# Patient Record
Sex: FEMALE | Race: WHITE | Employment: OTHER | URBAN - METROPOLITAN AREA
[De-identification: names, ages, dates, MRNs, and addresses within clinical notes are randomized per-mention and may not be internally consistent; named-entity substitution may affect disease eponyms.]

---

## 2017-02-20 ENCOUNTER — TRANSFERRED RECORDS (OUTPATIENT)
Dept: HEALTH INFORMATION MANAGEMENT | Facility: CLINIC | Age: 72
End: 2017-02-20

## 2017-02-27 ENCOUNTER — OFFICE VISIT (OUTPATIENT)
Dept: GASTROENTEROLOGY | Facility: CLINIC | Age: 72
End: 2017-02-27
Attending: INTERNAL MEDICINE
Payer: MEDICARE

## 2017-02-27 VITALS
OXYGEN SATURATION: 98 % | BODY MASS INDEX: 22.7 KG/M2 | SYSTOLIC BLOOD PRESSURE: 140 MMHG | WEIGHT: 128.09 LBS | TEMPERATURE: 97.8 F | HEIGHT: 63 IN | HEART RATE: 84 BPM | DIASTOLIC BLOOD PRESSURE: 87 MMHG | RESPIRATION RATE: 16 BRPM

## 2017-02-27 DIAGNOSIS — K21.9 GASTROESOPHAGEAL REFLUX DISEASE WITHOUT ESOPHAGITIS: Primary | ICD-10-CM

## 2017-02-27 PROCEDURE — 99212 OFFICE O/P EST SF 10 MIN: CPT

## 2017-02-27 RX ORDER — VITAMIN B COMPLEX
2 TABLET ORAL
COMMUNITY
Start: 2015-03-20 | End: 2022-01-28

## 2017-02-27 RX ORDER — PANTOPRAZOLE SODIUM 40 MG/1
40 TABLET, DELAYED RELEASE ORAL DAILY
Qty: 30 TABLET | Refills: 1 | Status: SHIPPED | OUTPATIENT
Start: 2017-02-27 | End: 2022-01-25

## 2017-02-27 RX ORDER — ONDANSETRON 4 MG/1
4 TABLET, ORALLY DISINTEGRATING ORAL
COMMUNITY
Start: 2015-04-21 | End: 2022-01-25

## 2017-02-27 RX ORDER — PENCICLOVIR 10 MG/G
CREAM TOPICAL
COMMUNITY
Start: 2009-07-28 | End: 2022-01-25

## 2017-02-27 RX ORDER — FLUCONAZOLE 150 MG/1
150 TABLET ORAL
COMMUNITY
Start: 2016-09-06 | End: 2022-01-25

## 2017-02-27 RX ORDER — IPRATROPIUM BROMIDE 21 UG/1
2 SPRAY, METERED NASAL
COMMUNITY
Start: 2017-01-31

## 2017-02-27 RX ORDER — DIPHENOXYLATE HYDROCHLORIDE AND ATROPINE SULFATE 2.5; .025 MG/1; MG/1
TABLET ORAL
COMMUNITY
Start: 2008-12-08

## 2017-02-27 RX ORDER — BUSPIRONE HYDROCHLORIDE 15 MG/1
7.5 TABLET ORAL 2 TIMES DAILY
COMMUNITY
Start: 2014-07-11

## 2017-02-27 RX ORDER — ESCITALOPRAM OXALATE 5 MG/1
5 TABLET ORAL
COMMUNITY
Start: 2016-11-06 | End: 2022-01-25

## 2017-02-27 ASSESSMENT — ENCOUNTER SYMPTOMS
RECTAL PAIN: 0
VOMITING: 0
DIARRHEA: 0
BLOOD IN STOOL: 0
HEARTBURN: 1
CONSTIPATION: 0
RECTAL BLEEDING: 0
BLOATING: 1
BOWEL INCONTINENCE: 0
NAUSEA: 1
JAUNDICE: 0
ABDOMINAL PAIN: 1

## 2017-02-27 ASSESSMENT — PAIN SCALES - GENERAL: PAINLEVEL: NO PAIN (0)

## 2017-02-27 NOTE — PROGRESS NOTES
"PRESENTING COMPLAINT:  The patient is a 71-year-old white female who is self-referred today for a second opinion regarding epigastric burning and presumed reflux.      HISTORY OF PRESENT ILLNESS:  The patient has intermittent history of epigastric complaints.  She, in the past, had endoscopy in 2007 and was told she had gastritis but an apparently normal esophagus.  I do not have this report.  Since that time, she has intermittently had epigastric and substernal burning.  In the past, she would try flavia for this, but more recently this has not been working.  For the past several months, she has had increasing complaints.  She tried taking Zantac 150 mg twice a day.  She was seen through her primary clinic on 02/20 for this complaint and at that time, reported that she was feeling better on the Zantac but since that time, Zantac stopped working.  She took this for a total of 10 days and stopped due to ineffective therapy.  She was taking 150 mg twice a day.  Subsequently, she had a supply of Protonix leftover from 2007 for which she had 5 tablets.  She took these once a day for the last 5 days and states that she has been \"much better.\"  Her symptoms are nearly completely resolved.      She describes a cramping and burning sensation in the epigastrium and lower substernal region.  This can last for many hours.  This, if anything, is increased with food and will last for approximately an hour.  She has no dysphagia, diarrhea, melena or hematochezia.  Her weight has been stable.  She, in the past, has had nausea but states this is not a major complaint for her at present.  She had a recent H. pylori testing with a fecal antigen through her primary care clinic, which she states was negative.  I do not have this result.  She denies NSAID use.      She states that she is intolerant to wheat, corn, and dairy and has been given some type of an antigen droplet through an outside physician, which she was taking and taking " "it larger than the recommended dosing.  She wonders whether this may have been contributing to her symptoms.      She had a colonoscopy in 09/2015, which was essentially normal.  There was a 3 mm polyp for which biopsy returned normal.  She does drink caffeine, up to a cup of coffee a day and has been cutting back but not completely eliminating.  She also drinks 1-2 glasses of wine per day.      She reports having had other evaluation, which I do not have available to me.  This included a HIDA scan for her gallbladder.  Apparently at one point there was consideration of cholecystectomy but her symptoms resolved.  She did not ever have a right upper quadrant ultrasound.      PAST MEDICAL HISTORY:  Significant for hyperlipidemia, goiter, osteopenia (previously osteoporosis but improving), anxiety, and a variety of hip and shoulder complaints.      PAST SURGICAL HISTORY:  Significant for appendectomy at age 16, bilateral Kim's neuroma surgery, trigger finger surgery and carpal tunnel release.      ALLERGIES:  Penicillin, sulfa, and \"other antibiotics.\"      MEDICATIONS:  Reviewed and documented in Epic.  Currently taking Protonix 40 mg once a day for the last 5 days.      SOCIAL HISTORY:  She quit smoking in 1968.  Alcohol and other ingestion is as mentioned in history of present illness.  She drinks tea rarely and does not use mints.      FAMILY HISTORY:  Not assessed.      REVIEW OF SYSTEMS:  See patient self-reported inventory.      PHYSICAL EXAMINATION:   GENERAL:  The patient is well-appearing, in no acute distress.   VITAL SIGNS:  Documented in Epic.   HEENT:  Shows no evidence of scleral icterus.   LUNGS:  Clear.   CARDIAC:  Regular rate and rhythm without murmur, gallop or rub.   ABDOMEN:  Soft and benign.  There is no focal mass, tenderness or hepatosplenomegaly.  There is no succussion splash.  Bowel sounds are normoactive.   SKIN:  Shows no jaundice.      ASSESSMENT AND PLAN:  Dyspepsia.  I believe this " is most likely related to gastroesophageal reflux disease.  This could also represent gastritis; however, she is not on NSAIDs and H. pylori is negative.  In addition, her symptoms are, if anything, worsened with food which would be somewhat atypical for gastritis.  Differential diagnosis would include biliary colic; however, I would not expect this to improve so reliably with proton pump inhibitor therapy.      We discussed reflux precautions and I gave her printed material today.  I specifically requested that she completely abstain from alcohol and caffeine.  We discussed several options for medication management given her concerns regarding long-term side effects with proton pump inhibitors.  She stated that she was willing to take this for 4-8 weeks, but does not want to take long-term PPIs.  After much discussion, it was decided that she would take Protonix 40 mg once a day for a total of 8 weeks.  At that point, she will try to discontinue the medications to see if her dietary manipulation and reflux precautions will relieve her symptoms.  At that point, if her symptoms recur, then she will try Carafate, which we also discussed.  This would be a safer option for her long-term but can lead to constipation.      At this point, she will contact me as needed, either if her symptoms fail to resolve with Protonix, recurred while on Protonix or do not resolve with subsequent treatment with Carafate.  She was also instructed to contact me if she develops dysphagia, unexplained weight loss or blood in the stools.  Otherwise, she will follow up with us on an as-needed basis.       Answers for HPI/ROS submitted by the patient on 2/27/2017   General Symptoms: No  Skin Symptoms: No  HENT Symptoms: No  EYE SYMPTOMS: No  HEART SYMPTOMS: No  LUNG SYMPTOMS: No  INTESTINAL SYMPTOMS: Yes  URINARY SYMPTOMS: No  GYNECOLOGIC SYMPTOMS: No  BREAST SYMPTOMS: No  SKELETAL SYMPTOMS: No  BLOOD SYMPTOMS: No  NERVOUS SYSTEM SYMPTOMS:  No  MENTAL HEALTH SYMPTOMS: No  Heart burn or indigestion: Yes  Nausea: Yes  Vomiting: No  Abdominal pain: Yes  Bloating: Yes  Constipation: No  Diarrhea: No  Blood in stool: No  Black stools: No  Rectal or Anal pain: No  Fecal incontinence: No  Rectal bleeding: No  Yellowing of skin or eyes: No  Vomit with blood: No  Change in stools: No  Hemorrhoids: No

## 2017-02-27 NOTE — MR AVS SNAPSHOT
"              After Visit Summary   2/27/2017    Diane Pierre    MRN: [id number]           Patient Information     Date Of Birth          1945        Visit Information        Provider Department      2/27/2017 4:20 PM Curtis Man MD [facility]        Today's Diagnoses     Gastroesophageal reflux disease without esophagitis    -  1       Follow-ups after your visit        Follow-up notes from your care team     Return if symptoms worsen or fail to improve.      Who to contact     If you have questions or need follow up information about today's clinic visit or your schedule please contact [facility] directly at [phone].  Normal or non-critical lab and imaging results will be communicated to you by Holairahart, letter or phone within 4 business days after the clinic has received the results. If you do not hear from us within 7 days, please contact the clinic through Holairahart or phone. If you have a critical or abnormal lab result, we will notify you by phone as soon as possible.  Submit refill requests through POI or call your pharmacy and they will forward the refill request to us. Please allow 3 business days for your refill to be completed.          Additional Information About Your Visit        MyChart Information     POI gives you secure access to your electronic health record. If you see a primary care provider, you can also send messages to your care team and make appointments. If you have questions, please call your primary care clinic.  If you do not have a primary care provider, please call [phone] and they will assist you.        Care EveryWhere ID     This is your Care EveryWhere ID. This could be used by other organizations to access your [facility] medical records  [id number]        Your Vitals Were     Pulse Temperature Respirations Height Pulse Oximetry BMI (Body Mass Index)    84 97.8  F (36.6  C) (Oral) 16 1.59 m (5' 2.6\") 98% 22.98 " kg/m2       Blood Pressure from Last 3 Encounters:   02/27/17 140/87    Weight from Last 3 Encounters:   02/27/17 58.1 kg (128 lb 1.4 oz)              Today, you had the following     No orders found for display         Today's Medication Changes          These changes are accurate as of: 2/27/17 11:59 PM.  If you have any questions, ask your nurse or doctor.               These medicines have changed or have updated prescriptions.        Dose/Directions    * pantoprazole 40 MG EC tablet   Commonly known as:  PROTONIX   This may have changed:  You were already taking a medication with the same name, and this prescription was added. Make sure you understand how and when to take each.   Used for:  Gastroesophageal reflux disease without esophagitis   Changed by:  Curtis Man MD        Dose:  40 mg   Take 1 tablet (40 mg) by mouth daily Take 30-60 minutes before a meal.   Quantity:  30 tablet   Refills:  1       * PANTOPRAZOLE SODIUM PO   This may have changed:  Another medication with the same name was added. Make sure you understand how and when to take each.   Changed by:  Curtis Man MD        Refills:  0       * Notice:  This list has 2 medication(s) that are the same as other medications prescribed for you. Read the directions carefully, and ask your doctor or other care provider to review them with you.         Where to get your medicines      These medications were sent to [facility] [id number] [place] [address] [address] [place] [address] E AT [facility] [address]  [address] [place] [address] E, [facility] [address]     Phone:  [phone]     pantoprazole 40 MG EC tablet                Primary Care Provider Office Phone # Fax #    Maryam Solomon [phone]       [facility] [address] [facility] [address]        Thank you!     Thank you for choosing [facility]  for your care. Our goal is always to provide you with excellent care. Hearing back from our  patients is one way we can continue to improve our services. Please take a few minutes to complete the written survey that you may receive in the mail after your visit with us. Thank you!             Your Updated Medication List - Protect others around you: Learn how to safely use, store and throw away your medicines at www.disposemymeds.org.          This list is accurate as of: 2/27/17 11:59 PM.  Always use your most recent med list.                   Brand Name Dispense Instructions for use    ALOE PO      Aloe vera juice.       busPIRone 15 MG tablet    BUSPAR     Take 15 mg by mouth       DENAVIR 1 % cream   Generic drug:  penciclovir          diclofenac 1 % Gel topical gel    VOLTAREN         escitalopram 5 MG tablet    LEXAPRO     Take 5 mg by mouth       fluconazole 150 MG tablet    DIFLUCAN     Take 150 mg by mouth       HM OMEGA-3-6-9 FATTY ACIDS Caps      Take two capsules by mouth daily       ipratropium 0.03 % spray    ATROVENT     Spray 2 sprays in nostril       MULTI-VITAMINS Tabs          ondansetron 4 MG ODT tab    ZOFRAN-ODT     Place 4 mg under the tongue       * pantoprazole 40 MG EC tablet    PROTONIX    30 tablet    Take 1 tablet (40 mg) by mouth daily Take 30-60 minutes before a meal.       * PANTOPRAZOLE SODIUM PO          progesterone 100 MG capsule    PROMETRIUM     Take 100 mg by mouth       Vitamin-B Complex Tabs      Take 2 tablets by mouth       * Notice:  This list has 2 medication(s) that are the same as other medications prescribed for you. Read the directions carefully, and ask your doctor or other care provider to review them with you.

## 2017-02-27 NOTE — LETTER
"2/27/2017       RE: Diane Pierre  [address] [facility] [address]     Dear Colleague,    Thank you for referring your patient, Diane Pierre, to the [facility] at [facility]. Please see a copy of my visit note below.    PRESENTING COMPLAINT:  The patient is a 71-year-old white female who is self-referred today for a second opinion regarding epigastric burning and presumed reflux.      HISTORY OF PRESENT ILLNESS:  The patient has intermittent history of epigastric complaints.  She, in the past, had endoscopy in 2007 and was told she had gastritis but an apparently normal esophagus.  I do not have this report.  Since that time, she has intermittently had epigastric and substernal burning.  In the past, she would try flavia for this, but more recently this has not been working.  For the past several months, she has had increasing complaints.  She tried taking Zantac 150 mg twice a day.  She was seen through her primary clinic on 02/20 for this complaint and at that time, reported that she was feeling better on the Zantac but since that time, Zantac stopped working.  She took this for a total of 10 days and stopped due to ineffective therapy.  She was taking 150 mg twice a day.  Subsequently, she had a supply of Protonix leftover from 2007 for which she had 5 tablets.  She took these once a day for the last 5 days and states that she has been \"much better.\"  Her symptoms are nearly completely resolved.      She describes a cramping and burning sensation in the epigastrium and lower substernal region.  This can last for many hours.  This, if anything, is increased with food and will last for approximately an hour.  She has no dysphagia, diarrhea, melena or hematochezia.  Her weight has been stable.  She, in the past, has had nausea but states this is not a major complaint for her at present.  She had a recent H. pylori testing with a fecal antigen " "through her primary care clinic, which she states was negative.  I do not have this result.  She denies NSAID use.      She states that she is intolerant to wheat, corn, and dairy and has been given some type of an antigen droplet through an outside physician, which she was taking and taking it larger than the recommended dosing.  She wonders whether this may have been contributing to her symptoms.      She had a colonoscopy in 09/2015, which was essentially normal.  There was a 3 mm polyp for which biopsy returned normal.  She does drink caffeine, up to a cup of coffee a day and has been cutting back but not completely eliminating.  She also drinks 1-2 glasses of wine per day.      She reports having had other evaluation, which I do not have available to me.  This included a HIDA scan for her gallbladder.  Apparently at one point there was consideration of cholecystectomy but her symptoms resolved.  She did not ever have a right upper quadrant ultrasound.      PAST MEDICAL HISTORY:  Significant for hyperlipidemia, goiter, osteopenia (previously osteoporosis but improving), anxiety, and a variety of hip and shoulder complaints.      PAST SURGICAL HISTORY:  Significant for appendectomy at age 16, bilateral Kim's neuroma surgery, trigger finger surgery and carpal tunnel release.      ALLERGIES:  Penicillin, sulfa, and \"other antibiotics.\"      MEDICATIONS:  Reviewed and documented in Epic.  Currently taking Protonix 40 mg once a day for the last 5 days.      SOCIAL HISTORY:  She quit smoking in 1968.  Alcohol and other ingestion is as mentioned in history of present illness.  She drinks tea rarely and does not use mints.      FAMILY HISTORY:  Not assessed.      REVIEW OF SYSTEMS:  See patient self-reported inventory.      PHYSICAL EXAMINATION:   GENERAL:  The patient is well-appearing, in no acute distress.   VITAL SIGNS:  Documented in Epic.   HEENT:  Shows no evidence of scleral icterus.   LUNGS:  Clear. "   CARDIAC:  Regular rate and rhythm without murmur, gallop or rub.   ABDOMEN:  Soft and benign.  There is no focal mass, tenderness or hepatosplenomegaly.  There is no succussion splash.  Bowel sounds are normoactive.   SKIN:  Shows no jaundice.      ASSESSMENT AND PLAN:  Dyspepsia.  I believe this is most likely related to gastroesophageal reflux disease.  This could also represent gastritis; however, she is not on NSAIDs and H. pylori is negative.  In addition, her symptoms are, if anything, worsened with food which would be somewhat atypical for gastritis.  Differential diagnosis would include biliary colic; however, I would not expect this to improve so reliably with proton pump inhibitor therapy.      We discussed reflux precautions and I gave her printed material today.  I specifically requested that she completely abstain from alcohol and caffeine.  We discussed several options for medication management given her concerns regarding long-term side effects with proton pump inhibitors.  She stated that she was willing to take this for 4-8 weeks, but does not want to take long-term PPIs.  After much discussion, it was decided that she would take Protonix 40 mg once a day for a total of 8 weeks.  At that point, she will try to discontinue the medications to see if her dietary manipulation and reflux precautions will relieve her symptoms.  At that point, if her symptoms recur, then she will try Carafate, which we also discussed.  This would be a safer option for her long-term but can lead to constipation.      At this point, she will contact me as needed, either if her symptoms fail to resolve with Protonix, recurred while on Protonix or do not resolve with subsequent treatment with Carafate.  She was also instructed to contact me if she develops dysphagia, unexplained weight loss or blood in the stools.  Otherwise, she will follow up with us on an as-needed basis.     Again, thank you for allowing me to  participate in the care of your patient.      Sincerely,    Curtis Man MD

## 2017-02-27 NOTE — NURSING NOTE
"Diane Pierre is a 71 year old female who presents for:  Chief Complaint   Patient presents with     Oncology Clinic Visit     Return for Upper abdomanal pain         Initial Vitals:  /87 (BP Location: Left arm, Patient Position: Chair, Cuff Size: Adult Regular)  Pulse 84  Temp 97.8  F (36.6  C) (Oral)  Resp 16  Ht 1.59 m (5' 2.6\")  Wt 58.1 kg (128 lb 1.4 oz)  SpO2 98%  BMI 22.98 kg/m2 Estimated body mass index is 22.98 kg/(m^2) as calculated from the following:    Height as of this encounter: 1.59 m (5' 2.6\").    Weight as of this encounter: 58.1 kg (128 lb 1.4 oz).. Body surface area is 1.6 meters squared. BP completed using cuff size: regular  No Pain (0) No LMP recorded. Allergies and medications reviewed.     Medications: Medication refills not needed today.  Pharmacy name entered into EPIC: Data Unavailable    Comments:     7 minutes for nursing intake (face to face time)   Virgie Edwards MA        "

## 2017-04-28 ENCOUNTER — CARE COORDINATION (OUTPATIENT)
Dept: GASTROENTEROLOGY | Facility: CLINIC | Age: 72
End: 2017-04-28

## 2017-04-28 NOTE — PROGRESS NOTES
Care Coordination Telephone Call  GI Service and Surgical Oncology    At the request of Dr. Man I have responded to patient call about refill of Protonix.  Dr. Man has asked that if she is not having further symptoms at this time, then she should stop Protonix, if reoccurrence then start Carafate prn.      I have asked the patient to call with any additional questions or concerns and have provided my contact information.    Plan:  Call if reoccurance of symptoms    Analy JADEN, HNBC, STAR-T  RN Care Coordinator  Surgical Oncology and GI service  Ph: [phone]  FAX: [phone]

## 2019-10-01 ENCOUNTER — HEALTH MAINTENANCE LETTER (OUTPATIENT)
Age: 74
End: 2019-10-01

## 2019-12-15 ENCOUNTER — HEALTH MAINTENANCE LETTER (OUTPATIENT)
Age: 74
End: 2019-12-15

## 2020-02-24 ENCOUNTER — RECORDS - HEALTHEAST (OUTPATIENT)
Dept: ADMINISTRATIVE | Facility: OTHER | Age: 75
End: 2020-02-24

## 2020-03-10 ENCOUNTER — RECORDS - HEALTHEAST (OUTPATIENT)
Dept: ADMINISTRATIVE | Facility: OTHER | Age: 75
End: 2020-03-10

## 2020-05-08 ENCOUNTER — AMBULATORY - HEALTHEAST (OUTPATIENT)
Dept: GENERAL RADIOLOGY | Facility: CLINIC | Age: 75
End: 2020-05-08

## 2020-05-08 DIAGNOSIS — Z11.59 ENCOUNTER FOR SCREENING FOR OTHER VIRAL DISEASES: ICD-10-CM

## 2020-05-18 ASSESSMENT — MIFFLIN-ST. JEOR: SCORE: 1071.49

## 2020-05-21 ENCOUNTER — ANESTHESIA - HEALTHEAST (OUTPATIENT)
Dept: SURGERY | Facility: AMBULATORY SURGERY CENTER | Age: 75
End: 2020-05-21

## 2020-05-22 ENCOUNTER — SURGERY - HEALTHEAST (OUTPATIENT)
Dept: SURGERY | Facility: AMBULATORY SURGERY CENTER | Age: 75
End: 2020-05-22

## 2020-05-22 ASSESSMENT — MIFFLIN-ST. JEOR: SCORE: 1071.49

## 2021-01-15 ENCOUNTER — HEALTH MAINTENANCE LETTER (OUTPATIENT)
Age: 76
End: 2021-01-15

## 2021-06-04 VITALS — WEIGHT: 135 LBS | HEIGHT: 63 IN | BODY MASS INDEX: 23.92 KG/M2

## 2021-06-08 NOTE — ANESTHESIA CARE TRANSFER NOTE
Last vitals:   Vitals:    05/22/20 0910   BP: 136/61   Pulse: 76   Resp: 16   Temp: 37.1  C (98.7  F)   SpO2: 99%     Patient's level of consciousness is alert   Spontaneous respirations: yes  Maintains airway independently: yes  Dentition unchanged: yes     Oropharynx: oropharynx clear of all foreign objects    QCDR Measures:  ASA# 20 - Surgical Safety Checklist: WHO surgical safety checklist completed prior to induction    PQRS# 430 - Adult PONV Prevention: 4558F - Pt received => 2 anti-emetic agents (different classes) preop & intraop  ASA# 8 - Peds PONV Prevention: NA - Not pediatric patient, not GA or 2 or more risk factors NOT present  PQRS# 424 - Donna-op Temp Management: 4559F - At least one body temp DOCUMENTED => 35.5C or 95.9F within required timeframe  PQRS# 426 - PACU Transfer Protocol: - Transfer of care checklist used  ASA# 14 - Acute Post-op Pain: ASA14B - Patient did NOT experience pain >= 7 out of 10

## 2021-06-08 NOTE — ANESTHESIA POSTPROCEDURE EVALUATION
Patient: Diane Pierre  Procedure(s) with comments:  BILATERAL UPPER LID BLEPHAROPLASY AND BILATERAL LOWER LID BLEPHAROPLASTY (COSMETIC) (Bilateral) - Cosmetic time for lower lids was 0819-0854am  Anesthesia type: MAC    Patient location: Phase II Recovery  Last vitals:   Vitals Value Taken Time   /67 5/22/2020  9:32 AM   Temp  5/22/2020  9:56 AM   Pulse 74 5/22/2020  9:31 AM   Resp 16 5/22/2020  9:30 AM   SpO2 89 % 5/22/2020  9:31 AM   Vitals shown include unvalidated device data.  Post vital signs: stable  Level of consciousness: awake and responds to simple questions  Post-anesthesia pain: pain controlled  Post-anesthesia nausea and vomiting: no  Pulmonary: unassisted, return to baseline  Cardiovascular: stable and blood pressure at baseline  Hydration: adequate  Anesthetic events: no    QCDR Measures:  ASA# 11 - Donna-op Cardiac Arrest: ASA11B - Patient did NOT experience unanticipated cardiac arrest  ASA# 12 - Donna-op Mortality Rate: ASA12B - Patient did NOT die  ASA# 13 - PACU Re-Intubation Rate: ASA13B - Patient did NOT require a new airway mgmt  ASA# 10 - Composite Anes Safety: ASA10A - No serious adverse event    Additional Notes:

## 2021-06-08 NOTE — ANESTHESIA PREPROCEDURE EVALUATION
Anesthesia Evaluation      Patient summary reviewed     Airway   Mallampati: I  Neck ROM: full   Pulmonary - normal exam   (+) asthma (Exercise/allergy induced, no symptoms today)                           Cardiovascular - normal exam  Exercise tolerance: > or = 10 METS  (+) , hypercholesterolemia,      Neuro/Psych    (+) depression,     Endo/Other    (+) arthritis,      GI/Hepatic/Renal - negative ROS           Dental - normal exam                        Anesthesia Plan  Planned anesthetic: MAC  Covid test negative per outside records  MAC with propofol  ASA 1     Anesthetic plan and risks discussed with: patient    Post-op plan: routine recovery

## 2021-09-04 ENCOUNTER — HEALTH MAINTENANCE LETTER (OUTPATIENT)
Age: 76
End: 2021-09-04

## 2021-09-23 ENCOUNTER — TRANSFERRED RECORDS (OUTPATIENT)
Dept: HEALTH INFORMATION MANAGEMENT | Facility: CLINIC | Age: 76
End: 2021-09-23
Payer: MEDICARE

## 2022-01-10 ENCOUNTER — TELEPHONE (OUTPATIENT)
Dept: OBGYN | Facility: CLINIC | Age: 77
End: 2022-01-10
Payer: MEDICARE

## 2022-01-10 NOTE — TELEPHONE ENCOUNTER
Unable to view records in care everywhere.  Talked with Diane and advised that she can bring copy of the MRI report with her to appointment.  She states that it notes a 6mm mass in uterus consistent with fibroid.    She will discuss with PATRICK Jones if additional imaging is needed, as she has vulvodynia, and would prefer to avoid.

## 2022-01-10 NOTE — TELEPHONE ENCOUNTER
----- Message from Hailey Cano RN sent at 1/10/2022  9:41 AM CST -----  Regarding: MRI records  Diane Ayala is scheduled on Thurs and would like her MRI records sent to us from [facility] prior to her appt

## 2022-01-11 ENCOUNTER — TELEPHONE (OUTPATIENT)
Dept: OBGYN | Facility: CLINIC | Age: 77
End: 2022-01-11
Payer: MEDICARE

## 2022-01-11 NOTE — TELEPHONE ENCOUNTER
----- Message from Radha Harris RN sent at 1/11/2022  4:03 PM CST -----  Regarding: Appointment with Daniella tomorrow - can this be a telephone visit?

## 2022-01-13 ENCOUNTER — VIRTUAL VISIT (OUTPATIENT)
Dept: OBGYN | Facility: CLINIC | Age: 77
End: 2022-01-13
Attending: OBSTETRICS & GYNECOLOGY
Payer: MEDICARE

## 2022-01-13 DIAGNOSIS — N94.819 VULVODYNIA: ICD-10-CM

## 2022-01-13 DIAGNOSIS — M81.0 AGE RELATED OSTEOPOROSIS, UNSPECIFIED PATHOLOGICAL FRACTURE PRESENCE: ICD-10-CM

## 2022-01-13 DIAGNOSIS — D25.1 INTRAMURAL LEIOMYOMA OF UTERUS: Primary | ICD-10-CM

## 2022-01-13 PROCEDURE — 99443 PR PHYSICIAN TELEPHONE EVALUATION 21-30 MIN: CPT | Mod: 95 | Performed by: OBSTETRICS & GYNECOLOGY

## 2022-01-13 NOTE — PROGRESS NOTES
"The patient has been notified of the following:      \"We have found that certain health care needs can be provided without the need for a face to face visit.  This service lets us provide the care you need with a phone conversation.       I will have full access to your [place] medical record during this entire phone call.   I will be taking notes for your medical record.      Since this is like an office visit, we will bill your insurance company for this service.       There are potential benefits and risks of telephone visits (e.g. limits to patient confidentiality) that differ from in-person visits.?  Confidentiality still applies for telephone services, and nobody will record the visit.  It is important to be in a quiet, private space that is free of distractions (including cell phone or other devices) during the visit.??      If during the course of the call I believe a telephone visit is not appropriate, you will not be charged for this service\"     Consent has been obtained for this service by care team member: Yes     Diane Pierre is a 77 yo P0 with history of vulvodynia who presents via telephone visit to review finding of fibroid on MRI.  Patient underwent right hip arthroplasty in 8/18/21.  Her post op course was complicated by non-displaced perprostheic femur fracture.  This was diagnosed by MRI on 9/23/21.  A finding noted on this MRI was \"a 6mm mass lesion in keeping with a uterine fibroid.  No other intrapelvic mass\".  She is concerned because she never was instructed on follow-up needed.    Diane states she has had no significant GYN issues.  She has a long history of vulvodynia.  She was a patient of Dr. Draper prior to 2011 and saw me once for this in 2011.  She still continues to have vulvar pain and states this pain \"ruined her sex life\".  Her PCP currently prescribe vaginal estriol and this has been somewhat helpful.  She also takes an oral progesterone nightly.  She wonders if she needs to " keep doing this as it is somewhat costly.  She denies any postmenopausal bleeding or other GYN concerns.    Diane has a history of osteoporosis.  She worked hard to improve her bone density with weights and exercise.  She states this has fallen back some with the pandemic.  The gym she used to go to closed and [facility] too [facility] for her smaller female frame.  She is seeing a physical therapist and these exercises have been helpful.    Due to her fracture and history of osteoporosis her endocrinologist had recommended she start Forteo.  She is concerned about the cost of this medication for what she perceives as minimal benefit, given she has previously improved her bone density with exercise.  She would like to learn more about this medication.    Diane states that life is quite stressful recently.  Her  has a neuroendocrine tumor which began in the pancreas 15 years ago.  The tumor has become quite aggressive and she fears that the last ditch chemo he will start next week will not work.    Past Medical History:   Diagnosis Date     Arthritis      Thyroid nodule      Past Surgical History:   Procedure Laterality Date     APPENDECTOMY       ARTHROPLASTY HIP Right      BLEPHAROPLASTY Bilateral 05/22/2020    Procedure: BILATERAL UPPER LID BLEPHAROPLASY AND BILATERAL LOWER LID BLEPHAROPLASTY (COSMETIC);  Surgeon: Angel Torres MD;  [facility];  Service: Ophthalmology     EYE SURGERY       Family History   Problem Relation Age of Onset     Heart Disease Mother         bypass at 70     Myocardial Infarction Father         lived to 90     Breast Cancer Maternal Grandmother         did not die from this     Physical Exam:  No vitals for this telephone appointment  Gen'l: no distress, engaged in conversation    Review of MRI 9/23/21:  (see scanned report)  Intrapelvic contents: No free fluid seen within the pelvis.    Series 8 image 18 demonstrates a rounded 6 mm low signal  intensity mass lesion in keeping with a uterine fibroid.  No other intrapelvic mass.    Assessment/Plan: 74 yo P0 with incidental finding of small uterine fibroid.  History of R hip arthroplasty and subsequent femur fracture with underlying diagnosis of osteoporosis.  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    - reviewed diagnosis of fibroids and that this is a common finding.  Fibroids do not have malignant potential and will not grow in menopause.  We discussed symptoms of pelvic malignancy and to call with any concerns in the future.  I reassured patient that this finding is not something she needs to worry about.  - Osteoporosis and medication management.  I discussed that the decision to treat osteoporosis is best guided by the expertise of her endocrinologist.  I did offer her a medication management appointment with Dr. Cristobal to review her medications and also discuss Forteo and typical side effect and benefits.  She could also discuss the use of oral progesterone, because with vaginal estrogen use she does not need to be on a progesterone as well.  She appreciates this consult.    Telephone call length: 20 minutes    On the day of this encounter at least 45 minutes of time was spent in consultation with face-to-face discussion, review of historical information, examination, documentation and post visit activities including communication with other providers and coordination of patient care.

## 2022-01-13 NOTE — LETTER
"1/13/2022       RE: Diane Pierre  [address] [facility] [address]     Dear Colleague,    Thank you for referring your patient, Diane Pierre, to the [facility] [place] at [facility]. Please see a copy of my visit note below.    The patient has been notified of the following:      \"We have found that certain health care needs can be provided without the need for a face to face visit.  This service lets us provide the care you need with a phone conversation.       I will have full access to your [place] medical record during this entire phone call.   I will be taking notes for your medical record.      Since this is like an office visit, we will bill your insurance company for this service.       There are potential benefits and risks of telephone visits (e.g. limits to patient confidentiality) that differ from in-person visits.?  Confidentiality still applies for telephone services, and nobody will record the visit.  It is important to be in a quiet, private space that is free of distractions (including cell phone or other devices) during the visit.??      If during the course of the call I believe a telephone visit is not appropriate, you will not be charged for this service\"     Consent has been obtained for this service by care team member: Yes     Diane Pierre is a 77 yo P0 with history of vulvodynia who presents via telephone visit to review finding of fibroid on MRI.  Patient underwent right hip arthroplasty in 8/18/21.  Her post op course was complicated by non-displaced perprostheic femur fracture.  This was diagnosed by MRI on 9/23/21.  A finding noted on this MRI was \"a 6mm mass lesion in keeping with a uterine fibroid.  No other intrapelvic mass\".  She is concerned because she never was instructed on follow-up needed.    Diane states she has had no significant GYN issues.  She has a long history of vulvodynia.  She was a " "patient of Dr. Draper prior to 2011 and saw me once for this in 2011.  She still continues to have vulvar pain and states this pain \"ruined her sex life\".  Her PCP currently prescribe vaginal estriol and this has been somewhat helpful.  She also takes an oral progesterone nightly.  She wonders if she needs to keep doing this as it is somewhat costly.  She denies any postmenopausal bleeding or other GYN concerns.    Diane has a history of osteoporosis.  She worked hard to improve her bone density with weights and exercise.  She states this has fallen back some with the pandemic.  The gym she used to go to closed and states the equipment at the  is too big for her smaller female frame.  She is seeing a physical therapist and these exercises have been helpful.    Due to her fracture and history of osteoporosis her endocrinologist had recommended she start Forteo.  She is concerned about the cost of this medication for what she perceives as minimal benefit, given she has previously improved her bone density with exercise.  She would like to learn more about this medication.    Diane states that life is quite stressful recently.  Her  has a neuroendocrine tumor which began in the pancreas 15 years ago.  The tumor has become quite aggressive and she fears that the last ditch chemo he will start next week will not work.    Past Medical History:   Diagnosis Date     Arthritis      Thyroid nodule      Past Surgical History:   Procedure Laterality Date     APPENDECTOMY       ARTHROPLASTY HIP Right      BLEPHAROPLASTY Bilateral 05/22/2020    Procedure: BILATERAL UPPER LID BLEPHAROPLASY AND BILATERAL LOWER LID BLEPHAROPLASTY (COSMETIC);  Surgeon: Angel Torres MD;  Location: [facility];  Service: Ophthalmology     EYE SURGERY       Family History   Problem Relation Age of Onset     Heart Disease Mother         bypass at 70     Myocardial Infarction Father         lived to 90     Breast Cancer Maternal " Grandmother         did not die from this     Physical Exam:  No vitals for this telephone appointment  Gen'l: no distress, engaged in conversation    Review of MRI 9/23/21:  (see scanned report)  Intrapelvic contents: No free fluid seen within the pelvis.    Series 8 image 18 demonstrates a rounded 6 mm low signal intensity mass lesion in keeping with a uterine fibroid.  No other intrapelvic mass.    Assessment/Plan: 76 yo P0 with incidental finding of small uterine fibroid.  History of R hip arthroplasty and subsequent femur fracture with underlying diagnosis of osteoporosis.  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    - reviewed diagnosis of fibroids and that this is a common finding.  Fibroids do not have malignant potential and will not grow in menopause.  We discussed symptoms of pelvic malignancy and to call with any concerns in the future.  I reassured patient that this finding is not something she needs to worry about.  - Osteoporosis and medication management.  I discussed that the decision to treat osteoporosis is best guided by the expertise of her endocrinologist.  I did offer her a medication management appointment with Dr. Cristobal to review her medications and also discuss Forteo and typical side effect and benefits.  She could also discuss the use of oral progesterone, because with vaginal estrogen use she does not need to be on a progesterone as well.  She appreciates this consult.    Telephone call length: 20 minutes    On the day of this encounter at least 45 minutes of time was spent in consultation with face-to-face discussion, review of historical information, examination, documentation and post visit activities including communication with other providers and coordination of patient care.

## 2022-01-25 ENCOUNTER — VIRTUAL VISIT (OUTPATIENT)
Dept: PHARMACY | Facility: CLINIC | Age: 77
End: 2022-01-25
Payer: COMMERCIAL

## 2022-01-25 DIAGNOSIS — N94.819 VULVODYNIA: ICD-10-CM

## 2022-01-25 DIAGNOSIS — J30.2 SEASONAL ALLERGIC RHINITIS, UNSPECIFIED TRIGGER: ICD-10-CM

## 2022-01-25 DIAGNOSIS — Z78.9 TAKES DIETARY SUPPLEMENTS: ICD-10-CM

## 2022-01-25 DIAGNOSIS — F41.9 ANXIETY: ICD-10-CM

## 2022-01-25 DIAGNOSIS — M81.0 AGE RELATED OSTEOPOROSIS, UNSPECIFIED PATHOLOGICAL FRACTURE PRESENCE: Primary | ICD-10-CM

## 2022-01-25 DIAGNOSIS — M19.90 ARTHRITIS PAIN: ICD-10-CM

## 2022-01-25 DIAGNOSIS — E78.5 HYPERLIPIDEMIA, UNSPECIFIED HYPERLIPIDEMIA TYPE: ICD-10-CM

## 2022-01-25 PROCEDURE — 99607 MTMS BY PHARM ADDL 15 MIN: CPT | Performed by: PHARMACIST

## 2022-01-25 PROCEDURE — 99605 MTMS BY PHARM NP 15 MIN: CPT | Performed by: PHARMACIST

## 2022-01-25 RX ORDER — MONTELUKAST SODIUM 10 MG/1
10 TABLET ORAL DAILY
COMMUNITY
Start: 2021-04-23

## 2022-01-25 RX ORDER — CLONAZEPAM 0.5 MG/1
.25-.5 TABLET ORAL
COMMUNITY
Start: 2021-08-10

## 2022-01-25 RX ORDER — ROSUVASTATIN CALCIUM 20 MG/1
20 TABLET, COATED ORAL
COMMUNITY
Start: 2022-01-10

## 2022-01-25 RX ORDER — ESCITALOPRAM OXALATE 10 MG/1
10 TABLET ORAL DAILY
COMMUNITY

## 2022-01-25 RX ORDER — ACYCLOVIR 50 MG/G
OINTMENT TOPICAL
COMMUNITY

## 2022-01-25 NOTE — PROGRESS NOTES
Medication Therapy Management (MTM) Encounter    ASSESSMENT:                            Medication Adherence/Access: No issues identified    Osteoporosis: Given patient's history of fracture, treatment with a PTH analog is reasonable.  Restarting/increasing weight-bearing exercise can also be helpful.  Educated patient on side effects of Tymlos and encouraged her to continue discussions and shared decision making with her endocrinologist.   Vulvodynia: stable;  Progesterone may be unnecessary therapy, but is also like to be safe if patient wishes to continue.    Hyperlipidemia: on appropriate therapy with rosuvastatin  Allergic Rhinitis: stable  Supplements: Current supplements are reasonable and likely safe, with the exception of the Livaplex, which contains bovine liver, kidney, adrenal and prostate.   The Adreset product from [employer] is likely safer - it has no animal glands & contains cordyceps, asian ginseng and rhodiola.    Pain:  stable  Anxiety:  Stable;  Recent increased anxiety, but is appropriately using her occasional clonazepam as needed.        PLAN:                            1.  Recommend changing from Livaplex back to the Adreset for adrenal support product.  The Livaplex contains bovine glands, and there are safety concerns about getting unknown quantities of hormones.    2.  For your osteoporosis, you are at increased risk of future fracture, given your history of fracture. I believe the Tymlos is a good option for you, and encourage you to follow-up with your endocrinologist for further discussions and finding a treatment plan that is comfortable for you.      Follow-up: as needed    SUBJECTIVE/OBJECTIVE:                          Diane Pierre is a 76 year old female called for an initial visit. She was referred to me from Dr. Brewer.      Reason for visit: osteoporosis & supplement review.    Allergies/ADRs: Reviewed in chart  Past Medical History: Reviewed in chart  Tobacco: She reports that  "she has never smoked. She has never used smokeless tobacco.      Medication Adherence/Access: no issues reported    Osteoporosis: had hip replacement followed by fracture, which was believed to be related to the surgery and metal rods.  Had been getting reclast annually;  Reports she has had 2 infusions.  She has been prescribed Tymlos from endocrinology.  She is concerned about potential side effects and impact on QOL;  Has had a kidney stone once;  Read reviews online -- in the past saw increases in BMD with resistance training;  Is currently doing PT and has monika participating in an \"Osteostrong\" exercise program which is now closed.  She would like to see what she can accomplish with physical activity again.   Vulvodynia: estriol compounded cream -- irritation and itching is less with this;  Also has lidocaine gel which is helpful.   Is on oral progesterone.  Hyperlipidemia: Current therapy includes rosuvastatin 20mg daily.  Patient reports no significant myalgias or other side effects.    Allergic Rhinitis: Current medications include montelukast 10 mg daily & ipratropium nasal spray.   Patient feels that current therapy is effective.   Supplements: coq10, D3 4000 units daily, calcium vitafusion (1000 units D), 500 mg - 1 twice daily, Indole-3-carbonal 300 mg with DIM- estrogen metabolism support (against breast cancer), Tumeric 2 capsules 1160 mg daily, Livaplex - (standard process);  Was previously on Aderset (metagenics), but switched to Livalex due to availability.  Also on Omega 3 600mg daily, daily energy MVI  Pain:  Has a prescription for tramadol -- uses it very rarely;   Would use naproxen 1x/week.  Anxiety:  Has a prescription for clonazepam 0.5 mg tablets for flying - took 1/2 tablet today -- due to stress with  with terminal cancer and sister visiting.  Also taking buspirone 7.5 mg twice daily and escitalopram 10mg daily.         Today's Vitals: There were no vitals taken for this " visit.  ----------------      I spent 45 minutes with this patient today.  A copy of the visit note was provided to the patient's provider(s).    The patient was sent via [employer] a summary of these recommendations.     Silke Cristobal, Pharm.D., [facility], [facility]      Telemedicine Visit Details  Type of service:  Telephone visit  Start Time: 10:13 AM  End Time: 10:58 AM  Originating Location (patient location): [place]  Distant Location (provider location):  [facility]     Medication Therapy Recommendations  Takes dietary supplements    Rationale: Unsafe medication for the patient - Adverse medication event - Safety   Recommendation: Change Medication   Status: Accepted - no CPA Needed   Note: changes livaplex supplement to adreset

## 2022-01-28 RX ORDER — CHOLECALCIFEROL (VITAMIN D3) 50 MCG
2 TABLET ORAL DAILY
COMMUNITY

## 2022-01-28 RX ORDER — INDOLE-3-CARBINOL
POWDER (GRAM) MISCELLANEOUS
COMMUNITY

## 2022-01-28 RX ORDER — UBIDECARENONE 100 MG
100 CAPSULE ORAL DAILY
COMMUNITY

## 2022-01-28 RX ORDER — VIT C/B6/B5/MAGNESIUM/HERB 173 50-5-6-5MG
2 CAPSULE ORAL DAILY
COMMUNITY

## 2022-01-28 RX ORDER — NAPROXEN SODIUM 220 MG
220 TABLET ORAL DAILY PRN
COMMUNITY

## 2022-01-28 NOTE — PATIENT INSTRUCTIONS
Recommendations from today's MTM visit:                                                    MTM (medication therapy management) is a service provided by a clinical pharmacist designed to help you get the most of out of your medicines.        1.  Recommend changing from Livaplex back to the Adreset for adrenal support product.  The Livaplex contains bovine glands, and there are safety concerns about getting unknown quantities of hormones.    2.  For your osteoporosis, you are at increased risk of future fracture, given your history of fracture. I believe the Tymlos is a good option for you, and encourage you to follow-up with your endocrinologist for further discussions and finding a treatment plan that is comfortable for you.    Follow-up: as needed    It was great to speak with you today.  I value your experience and would be very thankful for your time with providing feedback on our clinic survey. You may receive a survey via email or text message in the next few days.     To schedule another MTM appointment, please call the clinic directly or you may call the MTM scheduling line at [phone] or toll-free at [phone].     My Clinical Pharmacist's contact information:                                                      Please feel free to contact me with any questions or concerns you have.      Silke Cristobal, Pharm.D., [facility], [facility]  Phone:  [phone]

## 2022-02-19 ENCOUNTER — HEALTH MAINTENANCE LETTER (OUTPATIENT)
Age: 77
End: 2022-02-19

## 2022-10-16 ENCOUNTER — HEALTH MAINTENANCE LETTER (OUTPATIENT)
Age: 77
End: 2022-10-16

## 2023-04-01 ENCOUNTER — HEALTH MAINTENANCE LETTER (OUTPATIENT)
Age: 78
End: 2023-04-01

## 2023-06-01 ENCOUNTER — TELEPHONE (OUTPATIENT)
Dept: OBGYN | Facility: CLINIC | Age: 78
End: 2023-06-01
Payer: MEDICARE

## 2023-06-01 NOTE — TELEPHONE ENCOUNTER
[facility] Call Center    Phone Message    May a detailed message be left on voicemail: yes     Reason for Call: Pt is wondering if she needs a pelvic exam at her name and if she should stop taking progesterone.  Please call pt to discuss/schedule virtual appt if needed instead. Thank you    Action Taken: Message routed to:  Other: [facility]    Travel Screening: Not Applicable

## 2023-07-06 ENCOUNTER — TELEPHONE (OUTPATIENT)
Dept: OBGYN | Facility: CLINIC | Age: 78
End: 2023-07-06
Payer: MEDICARE

## 2023-07-06 DIAGNOSIS — D25.1 INTRAMURAL LEIOMYOMA OF UTERUS: Primary | ICD-10-CM

## 2023-07-06 NOTE — TELEPHONE ENCOUNTER
Diane called triage.  She had called on 6/1/23 with a question.  An RN tried to return her call, but Diane reports the number in her chart is incorrect.  I corrected it.      Diane has been using estriol 0.5mg cream for vulvodynia as needed, as well as oral progesterone, both for several years.  She reports that her PCP Maryam Solomon has never required a pelvic exam to fill these medications, but now she is seeing a new PCP who tells her she needs a pelvic exam in order to get these medications.      She is calling to ask if Dr. Brewer will fill these medications for her without performing a pelvic exam.      I scheduled her for first available with Dr. Brewer on 9/1/23 at 9:30am.      Diane wonders if this could be a virtual visit.  Either way, she wants a call back to let her know.      Routed to Dr. Brewer to advise.

## 2023-07-13 RX ORDER — PROGESTERONE 100 MG/1
100 CAPSULE ORAL DAILY
Qty: 90 CAPSULE | Refills: 3 | Status: SHIPPED | OUTPATIENT
Start: 2023-07-13

## 2023-09-01 ENCOUNTER — VIRTUAL VISIT (OUTPATIENT)
Dept: OBGYN | Facility: CLINIC | Age: 78
End: 2023-09-01
Attending: OBSTETRICS & GYNECOLOGY
Payer: MEDICARE

## 2023-09-01 DIAGNOSIS — Z79.890 POSTMENOPAUSAL HORMONE THERAPY: Primary | ICD-10-CM

## 2023-09-01 DIAGNOSIS — N94.819 VULVODYNIA: ICD-10-CM

## 2023-09-01 PROCEDURE — 99441 PR PHYSICIAN TELEPHONE EVALUATION 5-10 MIN: CPT | Mod: 95 | Performed by: OBSTETRICS & GYNECOLOGY

## 2023-09-01 NOTE — PROGRESS NOTES
"The patient has been notified of the following:      \"We have found that certain health care needs can be provided without the need for a face to face visit.  This service lets us provide the care you need with a phone conversation.       I will have full access to your [place] medical record during this entire phone call.   I will be taking notes for your medical record.      Since this is like an office visit, we will bill your insurance company for this service.       There are potential benefits and risks of telephone visits (e.g. limits to patient confidentiality) that differ from in-person visits.?  Confidentiality still applies for telephone services, and nobody will record the visit.  It is important to be in a quiet, private space that is free of distractions (including cell phone or other devices) during the visit.??      If during the course of the call I believe a telephone visit is not appropriate, you will not be charged for this service\"     Consent has been obtained for this service by care team member: Yes       Diane is a 79 yo P0 who has been on oral progesterone therapyfor many years.  She states she feels better on it and has been told it makes her seem younger. She uses vaginal estriol cream for vaginal/vulvar symptoms and this works well.  She is not on any systemic estrogen.  Years ago she was seen be Dr. Draper in our clinic.  Her PCP was Dr. Solomon; however, she is no longer at the practice and she is seeing her late 's doctor, Dr. Juanjo Mcwilliams.  She has not had a pelvic exam in many years and Dr. Mcwilliams suggested she have one given she is on the oral progesterone.    Patient denies any gyn concerns.  She has had no vaginal bleeding and any vulvar irritation is well controlled with the topical estriol.  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 She is not sexually active since her   last year.  \"I am fine with that\", she states. Her question for me is does she need a " pelvic exam and is it ok to continue to take the oral progesterone and topical estriol.    Past Medical History:   Diagnosis Date    Age-related osteoporosis with current pathological fracture with routine healing     Arthritis     Osteoporosis     Thyroid nodule     normal thyroid function     Past Surgical History:   Procedure Laterality Date    APPENDECTOMY      ARTHROPLASTY HIP Right     BLEPHAROPLASTY Bilateral 05/22/2020    Procedure: BILATERAL UPPER LID BLEPHAROPLASY AND BILATERAL LOWER LID BLEPHAROPLASTY (COSMETIC);  Surgeon: Angel Torres MD;  Location: [facility];  Service: Ophthalmology    EYE SURGERY       Family History   Problem Relation Age of Onset    Heart Disease Mother         bypass at 70    Myocardial Infarction Father         lived to 90    Breast Cancer Maternal Grandmother         did not die from this     No exam for this telephone visit.    Current Outpatient Medications   Medication    acyclovir (ZOVIRAX) 5 % external ointment    busPIRone (BUSPAR) 15 MG tablet    Calcium Carb-Cholecalciferol (CALCIUM 500/D) 500-400 MG-UNIT CHEW    clonazePAM (KLONOPIN) 0.5 MG tablet    co-enzyme Q-10 100 MG CAPS capsule    escitalopram (LEXAPRO) 10 MG tablet    Estriol 10 % CREA    HM OMEGA-3-6-9 FATTY ACIDS CAPS    Indole-3-Carbinol POWD    ipratropium (ATROVENT) 0.03 % spray    lidocaine (XYLOCAINE) 2 % external gel    montelukast (SINGULAIR) 10 MG tablet    Multiple Vitamin (MULTI-VITAMINS) TABS    naproxen sodium (ANAPROX) 220 MG tablet    progesterone (PROMETRIUM) 100 MG capsule    Rhodiola rosea (RHODIOLA PO)    rosuvastatin (CRESTOR) 20 MG tablet    Turmeric 500 MG CAPS    vitamin D3 (CHOLECALCIFEROL) 50 mcg (2000 units) tablet     No current facility-administered medications for this visit.     Assessment/Plan:  79 yo postmenopausal woman on progesterone therapy and topical estriol    - discussed that hormone therapy has been studied many times and there is no clear benefit to  progesterone only therapy.  She however, states this has significant benefit to her.  We discussed that there may be increased risk of breast cancer as the [facility] showed increased breast cancer risk for women on combined hormone therapy. She gets regular screening for breast cancer.  She desires to continue therapy.    - discussed that without complaint she does not have to have a pelvic exam.  She was happy about that.  - welcome to return to our clinic at any time.    Telephone call length: 10 minutes    On the day of this encounter 20 minutes of time was spent in consultation with telephone discussion, review of historical information, documentation and post visit activities including communication with other providers and coordination of patient care.  Diane Brewer MD

## 2023-09-01 NOTE — LETTER
"9/1/2023       RE: Diane Pierre  [address] [facility] [id number]     Dear Colleague,    Thank you for referring your patient, Diane Pierre, to the [facility] [place] [facility]. Please see a copy of my visit note below.    The patient has been notified of the following:      \"We have found that certain health care needs can be provided without the need for a face to face visit.  This service lets us provide the care you need with a phone conversation.       I will have full access to your [facility] medical record during this entire phone call.   I will be taking notes for your medical record.      Since this is like an office visit, we will bill your insurance company for this service.       There are potential benefits and risks of telephone visits (e.g. limits to patient confidentiality) that differ from in-person visits.?  Confidentiality still applies for telephone services, and nobody will record the visit.  It is important to be in a quiet, private space that is free of distractions (including cell phone or other devices) during the visit.??      If during the course of the call I believe a telephone visit is not appropriate, you will not be charged for this service\"     Consent has been obtained for this service by care team member: Yes       Diane is a 79 yo P0 who has been on oral progesterone therapyfor many years.  She states she feels better on it and has been told it makes her seem younger. She uses vaginal estriol cream for vaginal/vulvar symptoms and this works well.  She is not on any systemic estrogen.  Years ago she was seen be Dr. Draper in our clinic.  Her PCP was Dr. Solomon; however, she is no longer at the practice and she is seeing her late 's doctor, Dr. Juanjo Mcwilliams.  She has not had a pelvic exam in many years and Dr. Mcwilliams suggested she have one given she is on the oral progesterone.    Patient " "denies any gyn concerns.  She has had no vaginal bleeding and any vulvar irritation is well controlled with the topical estriol.  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 She is not sexually active since her   last year.  \"I am fine with that\", she states. Her question for me is does she need a pelvic exam and is it ok to continue to take the oral progesterone and topical estriol.    Past Medical History:   Diagnosis Date    Age-related osteoporosis with current pathological fracture with routine healing     Arthritis     Osteoporosis     Thyroid nodule     normal thyroid function     Past Surgical History:   Procedure Laterality Date    APPENDECTOMY      ARTHROPLASTY HIP Right     BLEPHAROPLASTY Bilateral 2020    Procedure: BILATERAL UPPER LID BLEPHAROPLASY AND BILATERAL LOWER LID BLEPHAROPLASTY (COSMETIC);  Surgeon: Angel Torres MD;  Location: [facility];  Service: Ophthalmology    EYE SURGERY       Family History   Problem Relation Age of Onset    Heart Disease Mother         bypass at 70    Myocardial Infarction Father         lived to 90    Breast Cancer Maternal Grandmother         did not die from this     No exam for this telephone visit.    Current Outpatient Medications   Medication    acyclovir (ZOVIRAX) 5 % external ointment    busPIRone (BUSPAR) 15 MG tablet    Calcium Carb-Cholecalciferol (CALCIUM 500/D) 500-400 MG-UNIT CHEW    clonazePAM (KLONOPIN) 0.5 MG tablet    co-enzyme Q-10 100 MG CAPS capsule    escitalopram (LEXAPRO) 10 MG tablet    Estriol 10 % CREA    HM OMEGA-3-6-9 FATTY ACIDS CAPS    Indole-3-Carbinol POWD    ipratropium (ATROVENT) 0.03 % spray    lidocaine (XYLOCAINE) 2 % external gel    montelukast (SINGULAIR) 10 MG tablet    Multiple Vitamin (MULTI-VITAMINS) TABS    naproxen sodium (ANAPROX) 220 MG tablet    progesterone (PROMETRIUM) 100 MG capsule    Rhodiola rosea (RHODIOLA PO)    rosuvastatin (CRESTOR) 20 MG tablet    Turmeric 500 MG CAPS    vitamin D3 " (CHOLECALCIFEROL) 50 mcg (2000 units) tablet     No current facility-administered medications for this visit.     Assessment/Plan:  79 yo postmenopausal woman on progesterone therapy and topical estriol    - discussed that hormone therapy has been studied many times and there is no clear benefit to progesterone only therapy.  She however, states this has significant benefit to her.  We discussed that there may be increased risk of breast cancer as the [facility] showed increased breast cancer risk for women on combined hormone therapy. She gets regular screening for breast cancer.  She desires to continue therapy.    - discussed that without complaint she does not have to have a pelvic exam.  She was happy about that.  - welcome to return to our clinic at any time.    Telephone call length: 10 minutes    On the day of this encounter 20 minutes of time was spent in consultation with telephone discussion, review of historical information, documentation and post visit activities including communication with other providers and coordination of patient care.  Diane Brewer MD

## 2024-06-01 ENCOUNTER — HEALTH MAINTENANCE LETTER (OUTPATIENT)
Age: 79
End: 2024-06-01

## 2025-06-14 ENCOUNTER — HEALTH MAINTENANCE LETTER (OUTPATIENT)
Age: 80
End: 2025-06-14